# Patient Record
Sex: FEMALE | Race: WHITE
[De-identification: names, ages, dates, MRNs, and addresses within clinical notes are randomized per-mention and may not be internally consistent; named-entity substitution may affect disease eponyms.]

---

## 2017-03-05 ENCOUNTER — HOSPITAL ENCOUNTER (INPATIENT)
Dept: HOSPITAL 84 - D.ER | Age: 27
LOS: 3 days | Discharge: HOME | DRG: 103 | End: 2017-03-08
Attending: FAMILY MEDICINE | Admitting: FAMILY MEDICINE
Payer: MEDICAID

## 2017-03-05 VITALS
HEIGHT: 62 IN | BODY MASS INDEX: 23.6 KG/M2 | HEIGHT: 62 IN | WEIGHT: 128.27 LBS | WEIGHT: 128.27 LBS | BODY MASS INDEX: 23.6 KG/M2

## 2017-03-05 VITALS — DIASTOLIC BLOOD PRESSURE: 88 MMHG | SYSTOLIC BLOOD PRESSURE: 140 MMHG

## 2017-03-05 DIAGNOSIS — K52.9: ICD-10-CM

## 2017-03-05 DIAGNOSIS — G43.A0: Primary | ICD-10-CM

## 2017-03-05 DIAGNOSIS — T40.7X5A: ICD-10-CM

## 2017-03-05 DIAGNOSIS — F17.203: ICD-10-CM

## 2017-03-05 DIAGNOSIS — E86.0: ICD-10-CM

## 2017-03-05 DIAGNOSIS — F12.90: ICD-10-CM

## 2017-03-05 DIAGNOSIS — E87.6: ICD-10-CM

## 2017-03-05 DIAGNOSIS — E83.42: ICD-10-CM

## 2017-03-05 LAB
ALBUMIN SERPL-MCNC: 3.9 G/DL (ref 3.4–5)
ALP SERPL-CCNC: 76 U/L (ref 46–116)
ALT SERPL-CCNC: 15 U/L (ref 10–68)
AMYLASE SERPL-CCNC: 51 U/L (ref 25–115)
ANION GAP SERPL CALC-SCNC: 19.8 MMOL/L (ref 8–16)
BASOPHILS NFR BLD AUTO: 1 % (ref 0–2)
BILIRUB SERPL-MCNC: 0.32 MG/DL (ref 0.2–1.3)
BUN SERPL-MCNC: 10 MG/DL (ref 7–18)
CALCIUM SERPL-MCNC: 9.1 MG/DL (ref 8.5–10.1)
CHLORIDE SERPL-SCNC: 105 MMOL/L (ref 98–107)
CO2 SERPL-SCNC: 21.1 MMOL/L (ref 21–32)
CREAT SERPL-MCNC: 0.8 MG/DL (ref 0.6–1.3)
ERYTHROCYTE [DISTWIDTH] IN BLOOD BY AUTOMATED COUNT: 13.9 % (ref 11.5–14.5)
GLOBULIN SER-MCNC: 3.9 G/L
GLUCOSE SERPL-MCNC: 177 MG/DL (ref 74–106)
HCG SERPL-ACNC: NEGATIVE M[IU]/ML
HCT VFR BLD CALC: 40.9 % (ref 36–48)
HGB BLD-MCNC: 13.7 G/DL (ref 12–16)
LIPASE SERPL-CCNC: 92 U/L (ref 73–393)
LYMPHOCYTES NFR BLD AUTO: 7 % (ref 15–50)
MCH RBC QN AUTO: 30.9 PG (ref 26–34)
MCHC RBC AUTO-ENTMCNC: 33.5 G/DL (ref 31–37)
MCV RBC: 92.1 FL (ref 80–100)
MONOCYTES NFR BLD: 1 % (ref 2–11)
NEUTROPHILS NFR BLD AUTO: 89 % (ref 40–80)
OSMOLALITY SERPL CALC.SUM OF ELEC: 285 MOSM/KG (ref 275–300)
PLATELET # BLD EST: (no result) 10*3/UL
PLATELET # BLD: 455 10X3/UL (ref 130–400)
PMV BLD AUTO: 9.2 FL (ref 7.4–10.4)
POTASSIUM SERPL-SCNC: 3.9 MMOL/L (ref 3.5–5.1)
PROT SERPL-MCNC: 7.8 G/DL (ref 6.4–8.2)
RBC # BLD AUTO: 4.44 10X6/UL (ref 4–5.4)
SODIUM SERPL-SCNC: 142 MMOL/L (ref 136–145)
WBC # BLD AUTO: 25.3 10X3/UL (ref 4.8–10.8)

## 2017-03-05 NOTE — NUR
Patient Name: KATE GONZALES Admission Status: ER
Accout number: [id number] Admission Date: 2017
: 1990 Admission Diagnosis: Colitis
Attending: ESTEFANÍA Current LOS: 1
 
Anticipated DC Date:
2017
Planned Disposition: Home or Self Care
Primary Insurance: MEDICAID [place]
 
 
Discharge Planning Comments: Cm met with patient to complete initial discharge
planning assessment. Patient gave consent to complete assessment. Patient
reports she lives home with her boyfriend. She is independent in her care at
home. Patient plans to return to home at time of discharge. She is unsure of
dc needs at this time. CM will continue to follow and assist with dc
plan/needs.
 
:
Shawna Kowalski RN, [facility]
[phone]
 
Is the patient Alert and Oriented? Yes  0
* How many steps to enter\exit or inside your home? Five  0
* PCP Dr. Grajeda  0
* [place] [facility] [place]  [place]
* Preadmission Environment Home with Family  0
* ADLs Independent  0
* Equipment None  0
* List name and contact numbers for known caregivers / representatives who
currently or will assist patient after discharge: Pardeep mcdermott -
[phone]  0
* Community resources currently utilized None  0
* Additional services required to return to the preadmission environment? No
0
* Can the patient safely return to the preadmission environment? Yes  0
* Has this patient been hospitalized within the prior 30 days at any hospital?
No

## 2017-03-05 NOTE — NUR
RECIEVED PT TO FLOOR FROM ED VIA WHEELCHAIR. PT IS ALERT AND ORIENTED AND ABLE
TO VERBALIZE NEEDS. IV IS PATENT AND SALINE LOC AT THIS TIME. PT IS
AMBULATORY BUT WAS INSTRUCTED TO CALL FOR ANY ASSISTANCE NEEDED. PT STATES
PAIN IS 10/10. NO NEEDS ARE VERBALIZED AT THIS TIME. WILL CONTINUE TO MONITOR.
PT IS ORIENTED TO ROOM AND USE OF CALL LIGHT. SIDE RAILS ARE UP X 2. BED IS IN
LOWEST POSITION. CALL LIGHT IS WITHIN REACH.

## 2017-03-05 NOTE — NUR
ADMIT ASSESSMENT COMPLETED. IV FLUIDS HUNG PER ORDER. ANTIBIOTIC GIVEN PER
ORDER. MORPHINE PCA ALSO INITIATED AT THIS TIME FOR PAIN 10/10. NO FURTHER
NEEDS AT THIS TIME. WILL MONITOR. SIDE RAILS X 2. BED LOW. CALL LIGHT IN
REACH.

## 2017-03-06 VITALS — DIASTOLIC BLOOD PRESSURE: 52 MMHG | SYSTOLIC BLOOD PRESSURE: 107 MMHG

## 2017-03-06 VITALS — DIASTOLIC BLOOD PRESSURE: 88 MMHG | SYSTOLIC BLOOD PRESSURE: 140 MMHG

## 2017-03-06 VITALS — SYSTOLIC BLOOD PRESSURE: 114 MMHG | DIASTOLIC BLOOD PRESSURE: 71 MMHG

## 2017-03-06 VITALS — DIASTOLIC BLOOD PRESSURE: 59 MMHG | SYSTOLIC BLOOD PRESSURE: 111 MMHG

## 2017-03-06 VITALS — DIASTOLIC BLOOD PRESSURE: 68 MMHG | SYSTOLIC BLOOD PRESSURE: 113 MMHG

## 2017-03-06 VITALS — DIASTOLIC BLOOD PRESSURE: 72 MMHG | SYSTOLIC BLOOD PRESSURE: 144 MMHG

## 2017-03-06 LAB
ALBUMIN SERPL-MCNC: 3.3 G/DL (ref 3.4–5)
ALP SERPL-CCNC: 59 U/L (ref 46–116)
ALT SERPL-CCNC: 15 U/L (ref 10–68)
AMPHETAMINES UR QL SCN: NEGATIVE QUAL
ANION GAP SERPL CALC-SCNC: 14.9 MMOL/L (ref 8–16)
APPEARANCE UR: (no result)
BARBITURATES UR QL SCN: POSITIVE QUAL
BASOPHILS NFR BLD AUTO: 0.1 % (ref 0–2)
BENZODIAZ UR QL SCN: POSITIVE QUAL
BILIRUB SERPL-MCNC: 0.55 MG/DL (ref 0.2–1.3)
BILIRUB SERPL-MCNC: NEGATIVE MG/DL
BUN SERPL-MCNC: 8 MG/DL (ref 7–18)
BZE UR QL SCN: NEGATIVE QUAL
CALCIUM SERPL-MCNC: 8.8 MG/DL (ref 8.5–10.1)
CANNABINOIDS UR QL SCN: POSITIVE QUAL
CHLORIDE SERPL-SCNC: 106 MMOL/L (ref 98–107)
CO2 SERPL-SCNC: 24.4 MMOL/L (ref 21–32)
COLOR UR: YELLOW
CREAT SERPL-MCNC: 0.9 MG/DL (ref 0.6–1.3)
EOSINOPHIL NFR BLD: 0.1 % (ref 0–7)
ERYTHROCYTE [DISTWIDTH] IN BLOOD BY AUTOMATED COUNT: 13.9 % (ref 11.5–14.5)
EST. AVERAGE GLUCOSE BLD GHB EST-MCNC: 111 MG/DL (ref 74–154)
GLOBULIN SER-MCNC: 3.4 G/L
GLUCOSE SERPL-MCNC: 100 MG/DL
GLUCOSE SERPL-MCNC: 126 MG/DL (ref 74–106)
HBA1C MFR BLD: 5.5 % (ref 4.8–6)
HCT VFR BLD CALC: 36.4 % (ref 36–48)
HGB BLD-MCNC: 11.9 G/DL (ref 12–16)
IMM GRANULOCYTES NFR BLD: 0.3 % (ref 0–5)
KETONES UR STRIP-MCNC: (no result) MG/DL
LEUKOCYTE ESTERASE: NEGATIVE
LYMPHOCYTES NFR BLD AUTO: 25.2 % (ref 15–50)
MCH RBC QN AUTO: 29.8 PG (ref 26–34)
MCHC RBC AUTO-ENTMCNC: 32.7 G/DL (ref 31–37)
MCV RBC: 91 FL (ref 80–100)
MONOCYTES NFR BLD: 7.5 % (ref 2–11)
NEUTROPHILS NFR BLD AUTO: 66.8 % (ref 40–80)
NITRITE UR-MCNC: NEGATIVE MG/ML
OPIATES UR QL SCN: POSITIVE QUAL
OSMOLALITY SERPL CALC.SUM OF ELEC: 282 MOSM/KG (ref 275–300)
PCP UR QL SCN: NEGATIVE QUAL
PH UR STRIP: 6 [PH] (ref 5–6)
PLATELET # BLD: 374 10X3/UL (ref 130–400)
PMV BLD AUTO: 9.3 FL (ref 7.4–10.4)
POTASSIUM SERPL-SCNC: 3.3 MMOL/L (ref 3.5–5.1)
PROT SERPL-MCNC: 6.7 G/DL (ref 6.4–8.2)
PROT UR-MCNC: NEGATIVE MG/DL
RBC # BLD AUTO: 4 10X6/UL (ref 4–5.4)
SODIUM SERPL-SCNC: 142 MMOL/L (ref 136–145)
SP GR UR STRIP: 1.01 (ref 1–1.02)
UDS - METH: NEGATIVE QUAL
UROBILINOGEN UR-MCNC: NORMAL MG/DL
WBC # BLD AUTO: 13.7 10X3/UL (ref 4.8–10.8)

## 2017-03-06 NOTE — NUR
PT IV TO RIGHT AC ACCIDENTALLY PULLED OUT. D/C'D WITH CATHETER TIP INTACT. NEW
IV RESITED TO LEFT AC X 3 ATTEMPTS. GOOD BLOOD RETURN. FLUSHES W/O DIFFICUTLY.
FLUIDS AND PCA HOOKED BACK UP PER ORDER. PT TOLERATED WELL. NO NEEDS VOICED.
WILL MONITOR. SIDE RAILS X 2. BED LOW. CALL LIGHT IN REACH.

## 2017-03-07 VITALS — SYSTOLIC BLOOD PRESSURE: 101 MMHG | DIASTOLIC BLOOD PRESSURE: 79 MMHG

## 2017-03-07 VITALS — SYSTOLIC BLOOD PRESSURE: 109 MMHG | DIASTOLIC BLOOD PRESSURE: 53 MMHG

## 2017-03-07 VITALS — SYSTOLIC BLOOD PRESSURE: 104 MMHG | DIASTOLIC BLOOD PRESSURE: 60 MMHG

## 2017-03-07 VITALS — SYSTOLIC BLOOD PRESSURE: 110 MMHG | DIASTOLIC BLOOD PRESSURE: 65 MMHG

## 2017-03-07 VITALS — SYSTOLIC BLOOD PRESSURE: 119 MMHG | DIASTOLIC BLOOD PRESSURE: 76 MMHG

## 2017-03-07 VITALS — SYSTOLIC BLOOD PRESSURE: 115 MMHG | DIASTOLIC BLOOD PRESSURE: 66 MMHG

## 2017-03-07 LAB
ALBUMIN SERPL-MCNC: 2.8 G/DL (ref 3.4–5)
ALP SERPL-CCNC: 50 U/L (ref 46–116)
ALT SERPL-CCNC: 15 U/L (ref 10–68)
ANION GAP SERPL CALC-SCNC: 11.8 MMOL/L (ref 8–16)
BASOPHILS NFR BLD AUTO: 0.3 % (ref 0–2)
BILIRUB SERPL-MCNC: 0.69 MG/DL (ref 0.2–1.3)
BUN SERPL-MCNC: 5 MG/DL (ref 7–18)
CALCIUM SERPL-MCNC: 8.6 MG/DL (ref 8.5–10.1)
CHLORIDE SERPL-SCNC: 110 MMOL/L (ref 98–107)
CO2 SERPL-SCNC: 25.3 MMOL/L (ref 21–32)
CREAT SERPL-MCNC: 0.8 MG/DL (ref 0.6–1.3)
EOSINOPHIL NFR BLD: 1 % (ref 0–7)
ERYTHROCYTE [DISTWIDTH] IN BLOOD BY AUTOMATED COUNT: 14 % (ref 11.5–14.5)
ERYTHROCYTE [SEDIMENTATION RATE] IN BLOOD: 11 MM/HR (ref 0–20)
GLOBULIN SER-MCNC: 3.4 G/L
GLUCOSE SERPL-MCNC: 117 MG/DL (ref 74–106)
HCG SERPL-ACNC: NEGATIVE M[IU]/ML
HCT VFR BLD CALC: 35.7 % (ref 36–48)
HGB BLD-MCNC: 11.8 G/DL (ref 12–16)
IMM GRANULOCYTES NFR BLD: 0.3 % (ref 0–5)
LYMPHOCYTES NFR BLD AUTO: 18.3 % (ref 15–50)
MCH RBC QN AUTO: 30.2 PG (ref 26–34)
MCHC RBC AUTO-ENTMCNC: 33.1 G/DL (ref 31–37)
MCV RBC: 91.3 FL (ref 80–100)
MONOCYTES NFR BLD: 6.3 % (ref 2–11)
NEUTROPHILS NFR BLD AUTO: 73.8 % (ref 40–80)
OSMOLALITY SERPL CALC.SUM OF ELEC: 284 MOSM/KG (ref 275–300)
PLATELET # BLD: 332 10X3/UL (ref 130–400)
PMV BLD AUTO: 8.9 FL (ref 7.4–10.4)
POTASSIUM SERPL-SCNC: 3.1 MMOL/L (ref 3.5–5.1)
PROT SERPL-MCNC: 6.2 G/DL (ref 6.4–8.2)
RBC # BLD AUTO: 3.91 10X6/UL (ref 4–5.4)
SODIUM SERPL-SCNC: 144 MMOL/L (ref 136–145)
WBC # BLD AUTO: 16.1 10X3/UL (ref 4.8–10.8)

## 2017-03-07 NOTE — NUR
ASSESSMENT AS PER FLOWSHEET. IV PATENT LEFT AC OF D5LR AT 100CC'S/HR SITE
CLEAR . PCA OF MORPHINE IN USE WITH SETTINGS AT 1MG Q10MIN W/10MG Q4H L/O
DENIES NEES UP AD AMOR IN HALLWAYS WITH FAMILY MEMBERS.

## 2017-03-07 NOTE — NUR
PATIENT SITTING UP IN BED, WITH COMPLAINTS OF LOWER ABDOMINAL PAIN, 10/10 ON
PAIN SCALE.  PATIENT REPOSITIONED, WARM PACK APPLIED TO ABDOMIN, PAIN
MEDICATION GIVEN.  PATIENT 8/10 PAIN SCALE. BED LOW, SIDE RAILS UP, CONTINUE
PLAN OF CARE, CONTINUE TO MONITOR.

## 2017-03-08 VITALS — DIASTOLIC BLOOD PRESSURE: 70 MMHG | SYSTOLIC BLOOD PRESSURE: 113 MMHG

## 2017-03-08 VITALS — DIASTOLIC BLOOD PRESSURE: 66 MMHG | SYSTOLIC BLOOD PRESSURE: 117 MMHG

## 2017-03-08 LAB
ALBUMIN SERPL-MCNC: 2.6 G/DL (ref 3.4–5)
ALP SERPL-CCNC: 44 U/L (ref 46–116)
ALT SERPL-CCNC: 15 U/L (ref 10–68)
ANION GAP SERPL CALC-SCNC: 10.8 MMOL/L (ref 8–16)
BASOPHILS NFR BLD AUTO: 0.7 % (ref 0–2)
BILIRUB SERPL-MCNC: 0.42 MG/DL (ref 0.2–1.3)
BUN SERPL-MCNC: 6 MG/DL (ref 7–18)
CALCIUM SERPL-MCNC: 8.1 MG/DL (ref 8.5–10.1)
CHLORIDE SERPL-SCNC: 109 MMOL/L (ref 98–107)
CO2 SERPL-SCNC: 26.2 MMOL/L (ref 21–32)
CREAT SERPL-MCNC: 0.7 MG/DL (ref 0.6–1.3)
EOSINOPHIL NFR BLD: 3.2 % (ref 0–7)
ERYTHROCYTE [DISTWIDTH] IN BLOOD BY AUTOMATED COUNT: 13.9 % (ref 11.5–14.5)
GLOBULIN SER-MCNC: 3.2 G/L
GLUCOSE SERPL-MCNC: 105 MG/DL (ref 74–106)
HCT VFR BLD CALC: 32.1 % (ref 36–48)
HGB BLD-MCNC: 10.5 G/DL (ref 12–16)
IMM GRANULOCYTES NFR BLD: 0.2 % (ref 0–5)
LYMPHOCYTES NFR BLD AUTO: 50.6 % (ref 15–50)
MCH RBC QN AUTO: 30 PG (ref 26–34)
MCHC RBC AUTO-ENTMCNC: 32.7 G/DL (ref 31–37)
MCV RBC: 91.7 FL (ref 80–100)
MONOCYTES NFR BLD: 6.9 % (ref 2–11)
NEUTROPHILS NFR BLD AUTO: 38.4 % (ref 40–80)
OSMOLALITY SERPL CALC.SUM OF ELEC: 282 MOSM/KG (ref 275–300)
PLATELET # BLD: 356 10X3/UL (ref 130–400)
PMV BLD AUTO: 9.3 FL (ref 7.4–10.4)
POTASSIUM SERPL-SCNC: 3 MMOL/L (ref 3.5–5.1)
PROT SERPL-MCNC: 5.8 G/DL (ref 6.4–8.2)
RBC # BLD AUTO: 3.5 10X6/UL (ref 4–5.4)
SODIUM SERPL-SCNC: 143 MMOL/L (ref 136–145)
WBC # BLD AUTO: 9 10X3/UL (ref 4.8–10.8)

## 2017-03-08 NOTE — NUR
LEFT VIA W/C DISCHARGE INSTRUCTIONS GONE OVER WITHG PT DEMONSTRATES
UNDERSTANDING AT PRESENT RX GIVEN AT PRESENT.

## 2017-03-08 NOTE — NUR
CM REASSESSMENT NOTE:  PATIENT IS DISCHARGING HOME TODAY. FRIEND AT BEDSIDE
WILL DRIVE PATIENT HOME. PATIENT DENIES HOME HEALTH OR ANY OTHER NEEDS FOR
DISCHARGE.  CM WILL CONTINUE TO FOLLOW PATIENT WITH D/C NEEDS AND PLANS.

## 2017-07-25 ENCOUNTER — HOSPITAL ENCOUNTER (EMERGENCY)
Dept: HOSPITAL 84 - D.ER | Age: 27
Discharge: HOME | End: 2017-07-25
Payer: MEDICAID

## 2017-07-25 VITALS — BODY MASS INDEX: 23.4 KG/M2

## 2017-07-25 DIAGNOSIS — F12.10: Primary | ICD-10-CM

## 2017-07-25 DIAGNOSIS — R19.7: ICD-10-CM

## 2017-07-25 DIAGNOSIS — F17.200: ICD-10-CM

## 2017-07-25 DIAGNOSIS — R11.0: ICD-10-CM

## 2017-07-25 DIAGNOSIS — R11.10: ICD-10-CM

## 2017-07-25 LAB
ALBUMIN SERPL-MCNC: 3.7 G/DL (ref 3.4–5)
ALP SERPL-CCNC: 49 U/L (ref 46–116)
ALT SERPL-CCNC: 14 U/L (ref 10–68)
AMPHETAMINES UR QL SCN: NEGATIVE QUAL
AMYLASE SERPL-CCNC: 35 U/L (ref 25–115)
ANION GAP SERPL CALC-SCNC: 19.1 MMOL/L (ref 8–16)
APPEARANCE UR: CLEAR
BARBITURATES UR QL SCN: NEGATIVE QUAL
BASOPHILS NFR BLD AUTO: 0.4 % (ref 0–2)
BENZODIAZ UR QL SCN: NEGATIVE QUAL
BILIRUB SERPL-MCNC: 0.55 MG/DL (ref 0.2–1.3)
BILIRUB SERPL-MCNC: NEGATIVE MG/DL
BUN SERPL-MCNC: 8 MG/DL (ref 7–18)
BZE UR QL SCN: NEGATIVE QUAL
CALCIUM SERPL-MCNC: 9.2 MG/DL (ref 8.5–10.1)
CANNABINOIDS UR QL SCN: POSITIVE QUAL
CHLORIDE SERPL-SCNC: 108 MMOL/L (ref 98–107)
CO2 SERPL-SCNC: 17.7 MMOL/L (ref 21–32)
COLOR UR: YELLOW
CREAT SERPL-MCNC: 0.8 MG/DL (ref 0.6–1.3)
EOSINOPHIL NFR BLD: 0.6 % (ref 0–7)
ERYTHROCYTE [DISTWIDTH] IN BLOOD BY AUTOMATED COUNT: 13.6 % (ref 11.5–14.5)
GLOBULIN SER-MCNC: 3.5 G/L
GLUCOSE SERPL-MCNC: 144 MG/DL (ref 74–106)
GLUCOSE SERPL-MCNC: NEGATIVE MG/DL
HCG UR QL: NEGATIVE
HCT VFR BLD CALC: 44.2 % (ref 36–48)
HGB BLD-MCNC: 15.3 G/DL (ref 12–16)
IMM GRANULOCYTES NFR BLD: 0.2 % (ref 0–5)
KETONES UR STRIP-MCNC: (no result) MG/DL
LEUKOCYTE ESTERASE: NEGATIVE
LIPASE SERPL-CCNC: 83 U/L (ref 73–393)
LYMPHOCYTES NFR BLD AUTO: 33.9 % (ref 15–50)
MCH RBC QN AUTO: 31.7 PG (ref 26–34)
MCHC RBC AUTO-ENTMCNC: 34.6 G/DL (ref 31–37)
MCV RBC: 91.7 FL (ref 80–100)
MONOCYTES NFR BLD: 6.7 % (ref 2–11)
NEUTROPHILS NFR BLD AUTO: 58.2 % (ref 40–80)
NITRITE UR-MCNC: NEGATIVE MG/ML
OPIATES UR QL SCN: POSITIVE QUAL
OSMOLALITY SERPL CALC.SUM OF ELEC: 281 MOSM/KG (ref 275–300)
PCP UR QL SCN: NEGATIVE QUAL
PH UR STRIP: 5 [PH] (ref 5–6)
PLATELET # BLD: 360 10X3/UL (ref 130–400)
PMV BLD AUTO: 9 FL (ref 7.4–10.4)
POTASSIUM SERPL-SCNC: 3.8 MMOL/L (ref 3.5–5.1)
PROT SERPL-MCNC: 7.2 G/DL (ref 6.4–8.2)
PROT UR-MCNC: (no result) MG/DL
RBC # BLD AUTO: 4.82 10X6/UL (ref 4–5.4)
SODIUM SERPL-SCNC: 141 MMOL/L (ref 136–145)
SP GR UR STRIP: 1.02 (ref 1–1.02)
UDS - METH: NEGATIVE QUAL
UROBILINOGEN UR-MCNC: NORMAL MG/DL
WBC # BLD AUTO: 10.7 10X3/UL (ref 4.8–10.8)

## 2018-07-28 ENCOUNTER — HOSPITAL ENCOUNTER (EMERGENCY)
Dept: HOSPITAL 84 - D.ER | Age: 28
Discharge: HOME | End: 2018-07-28
Payer: SELF-PAY

## 2018-07-28 VITALS
HEIGHT: 62 IN | BODY MASS INDEX: 20.98 KG/M2 | WEIGHT: 114 LBS | BODY MASS INDEX: 20.98 KG/M2 | WEIGHT: 114 LBS | HEIGHT: 62 IN

## 2018-07-28 VITALS — SYSTOLIC BLOOD PRESSURE: 138 MMHG | DIASTOLIC BLOOD PRESSURE: 87 MMHG

## 2018-07-28 DIAGNOSIS — M79.672: ICD-10-CM

## 2018-07-28 DIAGNOSIS — F17.200: ICD-10-CM

## 2018-07-28 DIAGNOSIS — M54.5: Primary | ICD-10-CM
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
## 2018-07-28 LAB — HCG UR QL: NEGATIVE

## 2019-08-20 ENCOUNTER — HOSPITAL ENCOUNTER (EMERGENCY)
Dept: HOSPITAL 84 - D.ER | Age: 29
Discharge: HOME | End: 2019-08-20
Payer: SELF-PAY

## 2019-08-20 VITALS — SYSTOLIC BLOOD PRESSURE: 128 MMHG | DIASTOLIC BLOOD PRESSURE: 66 MMHG

## 2019-08-20 VITALS
HEIGHT: 62 IN | WEIGHT: 110.23 LBS | HEIGHT: 62 IN | BODY MASS INDEX: 20.28 KG/M2 | BODY MASS INDEX: 20.28 KG/M2 | WEIGHT: 110.23 LBS

## 2019-08-20 DIAGNOSIS — R11.2: ICD-10-CM

## 2019-08-20 DIAGNOSIS — B34.9: Primary | ICD-10-CM

## 2019-08-20 LAB
ALBUMIN SERPL-MCNC: 3.9 G/DL (ref 3.4–5)
ALP SERPL-CCNC: 68 U/L (ref 46–116)
ALT SERPL-CCNC: 20 U/L (ref 10–68)
AMORPHOUS SEDIMENT: (no result) /LPF
AMPHETAMINES UR QL SCN: NEGATIVE QUAL
AMYLASE SERPL-CCNC: 44 U/L (ref 25–115)
ANION GAP SERPL CALC-SCNC: 14 MMOL/L (ref 8–16)
APPEARANCE UR: (no result)
BACTERIA #/AREA URNS HPF: (no result) /HPF
BARBITURATES UR QL SCN: NEGATIVE QUAL
BASOPHILS NFR BLD AUTO: 0.3 % (ref 0–2)
BENZODIAZ UR QL SCN: NEGATIVE QUAL
BILIRUB SERPL-MCNC: 0.34 MG/DL (ref 0.2–1.3)
BILIRUB SERPL-MCNC: NEGATIVE MG/DL
BUN SERPL-MCNC: 13 MG/DL (ref 7–18)
BZE UR QL SCN: NEGATIVE QUAL
CALCIUM SERPL-MCNC: 9.3 MG/DL (ref 8.5–10.1)
CANNABINOIDS UR QL SCN: POSITIVE QUAL
CHLORIDE SERPL-SCNC: 109 MMOL/L (ref 98–107)
CO2 SERPL-SCNC: 24.7 MMOL/L (ref 21–32)
COLOR UR: YELLOW
CREAT SERPL-MCNC: 1 MG/DL (ref 0.6–1.3)
EOSINOPHIL NFR BLD: 0.6 % (ref 0–7)
ERYTHROCYTE [DISTWIDTH] IN BLOOD BY AUTOMATED COUNT: 13.2 % (ref 11.5–14.5)
GLOBULIN SER-MCNC: 3.3 G/L
GLUCOSE SERPL-MCNC: 168 MG/DL (ref 74–106)
GLUCOSE SERPL-MCNC: NEGATIVE MG/DL
HCG UR QL: NEGATIVE
HCT VFR BLD CALC: 42.5 % (ref 36–48)
HGB BLD-MCNC: 15 G/DL (ref 12–16)
IMM GRANULOCYTES NFR BLD: 0.2 % (ref 0–5)
KETONES UR STRIP-MCNC: (no result) MG/DL
LIPASE SERPL-CCNC: 123 U/L (ref 73–393)
LYMPHOCYTES NFR BLD AUTO: 22 % (ref 15–50)
MCH RBC QN AUTO: 32.5 PG (ref 26–34)
MCHC RBC AUTO-ENTMCNC: 35.3 G/DL (ref 31–37)
MCV RBC: 92 FL (ref 80–100)
MONOCYTES NFR BLD: 4.2 % (ref 2–11)
MUCOUS THREADS #/AREA URNS LPF: (no result) /LPF
NEUTROPHILS NFR BLD AUTO: 72.7 % (ref 40–80)
NITRITE UR-MCNC: NEGATIVE MG/ML
OPIATES UR QL SCN: POSITIVE QUAL
OSMOLALITY SERPL CALC.SUM OF ELEC: 290 MOSM/KG (ref 275–300)
PCP UR QL SCN: NEGATIVE QUAL
PH UR STRIP: 5 [PH] (ref 5–6)
PLATELET # BLD: 366 10X3/UL (ref 130–400)
PMV BLD AUTO: 9.3 FL (ref 7.4–10.4)
POTASSIUM SERPL-SCNC: 3.7 MMOL/L (ref 3.5–5.1)
PROT SERPL-MCNC: 7.2 G/DL (ref 6.4–8.2)
PROT UR-MCNC: NEGATIVE MG/DL
RBC # BLD AUTO: 4.62 10X6/UL (ref 4–5.4)
RBC #/AREA URNS HPF: (no result) /HPF (ref 0–5)
SODIUM SERPL-SCNC: 144 MMOL/L (ref 136–145)
SP GR UR STRIP: 1.02 (ref 1–1.02)
SQUAMOUS #/AREA URNS HPF: (no result) /HPF (ref 0–5)
TROPONIN I SERPL-MCNC: < 0.017 NG/ML (ref 0–0.06)
UROBILINOGEN UR-MCNC: NORMAL MG/DL
WBC # BLD AUTO: 15.5 10X3/UL (ref 4.8–10.8)
WBC #/AREA URNS HPF: (no result) /HPF (ref 0–5)

## 2019-08-21 ENCOUNTER — HOSPITAL ENCOUNTER (INPATIENT)
Dept: HOSPITAL 84 - D.ER | Age: 29
LOS: 2 days | Discharge: HOME | DRG: 690 | End: 2019-08-23
Attending: FAMILY MEDICINE | Admitting: FAMILY MEDICINE
Payer: MEDICAID

## 2019-08-21 VITALS — SYSTOLIC BLOOD PRESSURE: 145 MMHG | DIASTOLIC BLOOD PRESSURE: 88 MMHG

## 2019-08-21 VITALS — DIASTOLIC BLOOD PRESSURE: 83 MMHG | SYSTOLIC BLOOD PRESSURE: 123 MMHG

## 2019-08-21 VITALS — HEIGHT: 62 IN | WEIGHT: 120 LBS | BODY MASS INDEX: 22.08 KG/M2 | BODY MASS INDEX: 22.08 KG/M2

## 2019-08-21 DIAGNOSIS — F17.213: ICD-10-CM

## 2019-08-21 DIAGNOSIS — B96.20: ICD-10-CM

## 2019-08-21 DIAGNOSIS — N10: Primary | ICD-10-CM

## 2019-08-21 DIAGNOSIS — E86.0: ICD-10-CM

## 2019-08-21 LAB
ALBUMIN SERPL-MCNC: 4.1 G/DL (ref 3.4–5)
ALP SERPL-CCNC: 58 U/L (ref 46–116)
ALT SERPL-CCNC: 27 U/L (ref 10–68)
AMORPHOUS SEDIMENT: (no result) /LPF
AMYLASE SERPL-CCNC: 29 U/L (ref 25–115)
ANION GAP SERPL CALC-SCNC: 14.6 MMOL/L (ref 8–16)
APPEARANCE UR: (no result)
BACTERIA #/AREA URNS HPF: (no result) /HPF
BASOPHILS NFR BLD AUTO: 0.1 % (ref 0–2)
BILIRUB SERPL-MCNC: 0.62 MG/DL (ref 0.2–1.3)
BILIRUB SERPL-MCNC: NEGATIVE MG/DL
BUN SERPL-MCNC: 18 MG/DL (ref 7–18)
CALCIUM SERPL-MCNC: 10.2 MG/DL (ref 8.5–10.1)
CHLORIDE SERPL-SCNC: 106 MMOL/L (ref 98–107)
CO2 SERPL-SCNC: 23.9 MMOL/L (ref 21–32)
COLOR UR: YELLOW
CREAT SERPL-MCNC: 0.9 MG/DL (ref 0.6–1.3)
EOSINOPHIL NFR BLD: 0 % (ref 0–7)
ERYTHROCYTE [DISTWIDTH] IN BLOOD BY AUTOMATED COUNT: 13.5 % (ref 11.5–14.5)
GLOBULIN SER-MCNC: 3.4 G/L
GLUCOSE SERPL-MCNC: 146 MG/DL (ref 74–106)
GLUCOSE SERPL-MCNC: NEGATIVE MG/DL
HCG UR QL: NEGATIVE
HCT VFR BLD CALC: 40.5 % (ref 36–48)
HGB BLD-MCNC: 14.2 G/DL (ref 12–16)
IMM GRANULOCYTES NFR BLD: 0.4 % (ref 0–5)
KETONES UR STRIP-MCNC: (no result) MG/DL
LIPASE SERPL-CCNC: 68 U/L (ref 73–393)
LYMPHOCYTES NFR BLD AUTO: 15.5 % (ref 15–50)
MCH RBC QN AUTO: 32.1 PG (ref 26–34)
MCHC RBC AUTO-ENTMCNC: 35.1 G/DL (ref 31–37)
MCV RBC: 91.4 FL (ref 80–100)
MONOCYTES NFR BLD: 6.5 % (ref 2–11)
MUCOUS THREADS #/AREA URNS LPF: (no result) /LPF
NEUTROPHILS NFR BLD AUTO: 77.5 % (ref 40–80)
NITRITE UR-MCNC: POSITIVE MG/ML
OSMOLALITY SERPL CALC.SUM OF ELEC: 285 MOSM/KG (ref 275–300)
PH UR STRIP: 5 [PH] (ref 5–6)
PLATELET # BLD: 334 10X3/UL (ref 130–400)
PMV BLD AUTO: 9.2 FL (ref 7.4–10.4)
POTASSIUM SERPL-SCNC: 3.5 MMOL/L (ref 3.5–5.1)
PROT SERPL-MCNC: 7.5 G/DL (ref 6.4–8.2)
PROT UR-MCNC: (no result) MG/DL
RBC # BLD AUTO: 4.43 10X6/UL (ref 4–5.4)
RBC #/AREA URNS HPF: (no result) /HPF (ref 0–5)
SODIUM SERPL-SCNC: 141 MMOL/L (ref 136–145)
SP GR UR STRIP: 1.02 (ref 1–1.02)
SQUAMOUS #/AREA URNS HPF: (no result) /HPF (ref 0–5)
TROPONIN I SERPL-MCNC: < 0.017 NG/ML (ref 0–0.06)
UROBILINOGEN UR-MCNC: NORMAL MG/DL
WBC # BLD AUTO: 18.7 10X3/UL (ref 4.8–10.8)
WBC #/AREA URNS HPF: (no result) /HPF (ref 0–5)

## 2019-08-21 NOTE — NUR
NEW PATIENT ADMIT FROM ER VIA HOSPITAL BED AND HOSPITAL STAFF. PATIENT IS
AWAKE AND LETHARGIC. IV TO LT AC. WILL CONTINUE WITH PLAN OF CARE.

## 2019-08-21 NOTE — NUR
PT ALERT AND ORIENTED X4 LAYING IN BED. PT LETHARGIC AND COMPLAINS OF PAIN IN
ABDOMEN. PT IS ON PCA AT THIS TIME. HEATING PAD PLACED ON PT. VITALS STABLE,
BED LOW, CALL LIGHT WITHIN REACH. WILL CONTINUE TO MONITOR.

## 2019-08-22 VITALS — DIASTOLIC BLOOD PRESSURE: 74 MMHG | SYSTOLIC BLOOD PRESSURE: 137 MMHG

## 2019-08-22 VITALS — SYSTOLIC BLOOD PRESSURE: 102 MMHG | DIASTOLIC BLOOD PRESSURE: 59 MMHG

## 2019-08-22 VITALS — DIASTOLIC BLOOD PRESSURE: 65 MMHG | SYSTOLIC BLOOD PRESSURE: 125 MMHG

## 2019-08-22 VITALS — DIASTOLIC BLOOD PRESSURE: 62 MMHG | SYSTOLIC BLOOD PRESSURE: 109 MMHG

## 2019-08-22 LAB
ALBUMIN SERPL-MCNC: 3.3 G/DL (ref 3.4–5)
ALP SERPL-CCNC: 50 U/L (ref 46–116)
ALT SERPL-CCNC: 29 U/L (ref 10–68)
AMPHETAMINES UR QL SCN: NEGATIVE QUAL
ANION GAP SERPL CALC-SCNC: 10.9 MMOL/L (ref 8–16)
BARBITURATES UR QL SCN: NEGATIVE QUAL
BASOPHILS NFR BLD AUTO: 0.3 % (ref 0–2)
BENZODIAZ UR QL SCN: NEGATIVE QUAL
BILIRUB SERPL-MCNC: 0.47 MG/DL (ref 0.2–1.3)
BUN SERPL-MCNC: 15 MG/DL (ref 7–18)
BZE UR QL SCN: NEGATIVE QUAL
CALCIUM SERPL-MCNC: 8.5 MG/DL (ref 8.5–10.1)
CANNABINOIDS UR QL SCN: POSITIVE QUAL
CHLORIDE SERPL-SCNC: 104 MMOL/L (ref 98–107)
CO2 SERPL-SCNC: 27.9 MMOL/L (ref 21–32)
CREAT SERPL-MCNC: 0.6 MG/DL (ref 0.6–1.3)
EOSINOPHIL NFR BLD: 0.3 % (ref 0–7)
ERYTHROCYTE [DISTWIDTH] IN BLOOD BY AUTOMATED COUNT: 13.1 % (ref 11.5–14.5)
GLOBULIN SER-MCNC: 2.9 G/L
GLUCOSE SERPL-MCNC: 115 MG/DL (ref 74–106)
HCT VFR BLD CALC: 37.3 % (ref 36–48)
HGB BLD-MCNC: 12.8 G/DL (ref 12–16)
IMM GRANULOCYTES NFR BLD: 0.2 % (ref 0–5)
LYMPHOCYTES NFR BLD AUTO: 30.3 % (ref 15–50)
MCH RBC QN AUTO: 31.1 PG (ref 26–34)
MCHC RBC AUTO-ENTMCNC: 34.3 G/DL (ref 31–37)
MCV RBC: 90.5 FL (ref 80–100)
MONOCYTES NFR BLD: 7 % (ref 2–11)
NEUTROPHILS NFR BLD AUTO: 61.9 % (ref 40–80)
OPIATES UR QL SCN: POSITIVE QUAL
OSMOLALITY SERPL CALC.SUM OF ELEC: 279 MOSM/KG (ref 275–300)
PCP UR QL SCN: NEGATIVE QUAL
PLATELET # BLD: 284 10X3/UL (ref 130–400)
PMV BLD AUTO: 9.2 FL (ref 7.4–10.4)
POTASSIUM SERPL-SCNC: 3.8 MMOL/L (ref 3.5–5.1)
PROT SERPL-MCNC: 6.2 G/DL (ref 6.4–8.2)
RBC # BLD AUTO: 4.12 10X6/UL (ref 4–5.4)
SODIUM SERPL-SCNC: 139 MMOL/L (ref 136–145)
WBC # BLD AUTO: 11.9 10X3/UL (ref 4.8–10.8)

## 2019-08-22 NOTE — NUR
ALERT SITTING UP IN BED C/O SEVERE ABD PAIN, INSTRUCTED THAT WOULD GIVE PAIN
MEDICATION AS SOON AS ITS TIME, SEE SHIFT ASSESSMENT, CALL LIGHT IN REEACH

## 2019-08-22 NOTE — NUR
UP TO SHOWER AFTER RECIEVING ZODEVIKA NOW CRYING WITH SEVERE ABD PAIN REQUESTING
TORODOL, GIVEN AS ORDERED

## 2019-08-22 NOTE — NUR
AGAIN CRYING IN PAIN, REQUESTING DEMEROL, EXPLAINED THAT IF GET IT NOW WOULD
NOT BE ABLE TO HAVE ANYTHING ELSE UNTIL 0430 STATES I DONT CARE NEED SOMETHING
NOW DEMEROL GIVEN AS ORDERED

## 2019-08-22 NOTE — NUR
PT RESTING IN BEDS WITH EYES CLOSE. RR EVEN AND UNLABORED. NO S/S OF DISTRESS
AT THIS TIME. BED LOW CALL LIGHT WITHIN REACH, WILL CONTINUE TO MONITOR.

## 2019-08-22 NOTE — NUR
CRYING IN PAIN STATES GOT TO CALL SOMEBODY AND GET ME SOMETHING, REFUSED TO
LAY IN BED UP AMBULATING TO NURSING STATION CRYING, ELLEN SPIVEY REPORTED PAIN
UNRELIEVED WITH CURRENT MEDS, ORDER RECIEVED FOR ONE TIME DOSE OF MORPHINE 2MG
IV, GIVEN AS ORDERED

## 2019-08-22 NOTE — NUR
N/V ZOFRAN GIVEN, CONTINUES TO C/O ABD PAIN REQUESTIGN TORODOL, INSTRUCTED SUSHMA
HAD ONLY BEEN AN HOUR SINCE DEMEROL NEED TO SPACE PAIN OUT MORE, WILL MONITOR

## 2019-08-22 NOTE — NUR
PT HAS COMPLAINED OF CONTINUED PAIN THROUGHOUT THE DAY.  DILAUDID HAS BEEN
DC'D AND HAS BEEN GIVEN TORODOL AND DEMEROL.  STATES AT PRESENT TIME HER PAIN
IN ABOUT A 6.  HAD N/V WITH GREEN EMESIS EARLIER IN THE DAY BUT IMPROVED WITH
ZOFRAN AND IS NOW SIPPING AND KEEPING DOWN [employer]ON[employer]A. NO REQUESTS AT
PRESENT TIME.

## 2019-08-23 VITALS — DIASTOLIC BLOOD PRESSURE: 85 MMHG | SYSTOLIC BLOOD PRESSURE: 174 MMHG

## 2019-08-23 VITALS — SYSTOLIC BLOOD PRESSURE: 129 MMHG | DIASTOLIC BLOOD PRESSURE: 82 MMHG

## 2019-08-23 VITALS — SYSTOLIC BLOOD PRESSURE: 126 MMHG | DIASTOLIC BLOOD PRESSURE: 74 MMHG

## 2019-08-23 LAB
ALBUMIN SERPL-MCNC: 3.5 G/DL (ref 3.4–5)
ALP SERPL-CCNC: 49 U/L (ref 46–116)
ALT SERPL-CCNC: 29 U/L (ref 10–68)
ANION GAP SERPL CALC-SCNC: 15.9 MMOL/L (ref 8–16)
BASOPHILS NFR BLD AUTO: 0.2 % (ref 0–2)
BILIRUB SERPL-MCNC: 0.45 MG/DL (ref 0.2–1.3)
BUN SERPL-MCNC: 16 MG/DL (ref 7–18)
CALCIUM SERPL-MCNC: 8.7 MG/DL (ref 8.5–10.1)
CHLORIDE SERPL-SCNC: 102 MMOL/L (ref 98–107)
CO2 SERPL-SCNC: 22.7 MMOL/L (ref 21–32)
CREAT SERPL-MCNC: 0.7 MG/DL (ref 0.6–1.3)
EOSINOPHIL NFR BLD: 0.2 % (ref 0–7)
ERYTHROCYTE [DISTWIDTH] IN BLOOD BY AUTOMATED COUNT: 12.7 % (ref 11.5–14.5)
GLOBULIN SER-MCNC: 2.9 G/L
GLUCOSE SERPL-MCNC: 123 MG/DL (ref 74–106)
HCT VFR BLD CALC: 37.5 % (ref 36–48)
HGB BLD-MCNC: 13.2 G/DL (ref 12–16)
IMM GRANULOCYTES NFR BLD: 0.4 % (ref 0–5)
LYMPHOCYTES NFR BLD AUTO: 26 % (ref 15–50)
MCH RBC QN AUTO: 31.6 PG (ref 26–34)
MCHC RBC AUTO-ENTMCNC: 35.2 G/DL (ref 31–37)
MCV RBC: 89.7 FL (ref 80–100)
MONOCYTES NFR BLD: 8.8 % (ref 2–11)
NEUTROPHILS NFR BLD AUTO: 64.4 % (ref 40–80)
OSMOLALITY SERPL CALC.SUM OF ELEC: 275 MOSM/KG (ref 275–300)
PLATELET # BLD: 268 10X3/UL (ref 130–400)
PMV BLD AUTO: 9 FL (ref 7.4–10.4)
POTASSIUM SERPL-SCNC: 3.6 MMOL/L (ref 3.5–5.1)
PROT SERPL-MCNC: 6.4 G/DL (ref 6.4–8.2)
RBC # BLD AUTO: 4.18 10X6/UL (ref 4–5.4)
SODIUM SERPL-SCNC: 137 MMOL/L (ref 136–145)
WBC # BLD AUTO: 10.9 10X3/UL (ref 4.8–10.8)

## 2019-08-23 NOTE — NUR
BACK IN BED AND REQUESTING PAIN MED AND ALSO SOMETHING FOR NAUSEA.  STATES SHE
VOMITED IN SHOWER BUT NONE WAS OBSERVED.  MEDICATION GIVEN AND IV RECONNECTED
AT KVO RATE.  ASSESSMENT COMPLETED AND WILL CONTINUE POC.  BED IN LOW LOCKED
POSITION AND CALL LIGHT IN REACH.

## 2019-08-23 NOTE — NUR
DISCHARGE INSTRUCTIONS REVIEWED WITH PT AND VERBALIZES UNDERSTANDING.
PRESCRIPTION GIVEN FOR PAIN MED AND ANTIBIOTICS.  IV DC'D LEFT AC WITH CATH
TIP INTACT.  LEFT FLOOR VIA W/C WITH ALL PERSONAL BELONGINGS AND LEFT
FACILITY VIA PRIVATE VEHICLE WITH HER UNCLE.

## 2019-08-23 NOTE — NUR
SITTING ON SHOWER CHAIR IN SHOWER AND REQUESTING PAIN MED.  INFORMED PT WOULD
NOT ADMINISTER MED WHILE SHE IS IN THE SHOWER AND WHEN SHE DECIDES TO GET BACK
IN BED I WOULD GIVE IT TO HER.

## 2019-08-23 NOTE — MORECARE
CASE MANAGEMENT DISCHARGE SUMMARY
 
 
PATIENT: KATE GONZALES              UNIT: [id number]
ACCOUNT#: [id number]                       ADM DATE: 19
AGE: 29     : [birth year]  SEX: F            ROOM/BED: D.2136    
AUTHOR: DAYTON MARADIAGA                             PHYSICIAN:                               
 
REFERRING PHYSICIAN: NITISH ROJAS DO            
DATE OF SERVICE: 19
Discharge Plan
 
 
Patient Name: KATE GONZALES
Facility: [facility]:[place]
Encounter #: [id number]
Medical Record #: [id number]
: 1990
Planned Disposition: Home
Anticipated Discharge Date: 19
 
Discharge Date: 2019
Expected LOS: 2
Initial Reviewer: [email or web address]
Initial Review Date: 2019
Generated: 19   4:57 pm 
  
 
 
 
 
 
 
 
Patient Name: KATE GONZALES
 
Encounter #: [id number]
Page [id number]
 
 
 
 
 
Electronically Signed by DAYTON MARADIAGA on 19 at 1557
 
 
 
 
 
 
**All edits/amendments must be made on the electronic document**
 
DICTATION DATE: 19 1557     : RAFFY  19 1557     
RPT#: [id number]                                DC DATE:19
                                               STATUS: DIS IN  
[facility]
[address] [place] [address]
***END OF REPORT***

## 2019-08-23 NOTE — MORECARE
CASE MANAGEMENT DISCHARGE SUMMARY
 
 
PATIENT: KATE GONZALES              UNIT: [id number]
ACCOUNT#: [id number]                       ADM DATE: 19
AGE: 29     : [birth year]  SEX: F            ROOM/BED: D.2136    
AUTHOR: DAYTON MARADIAGA                             PHYSICIAN:                               
 
REFERRING PHYSICIAN: NITISH ROJAS DO            
DATE OF SERVICE: 19
Discharge Plan
 
 
Patient Name: KATE GONZALES
Facility: [facility]:[place]
Encounter #: [id number]
Medical Record #: [id number]
: 1990
Planned Disposition: Home
Anticipated Discharge Date: 19
 
Discharge Date: 2019
Expected LOS: 2
Initial Reviewer: [email or web address]
Initial Review Date: 2019
Generated: 19   5:11 pm 
 DCPIA - Discharge Planning Initial Assessment
 
Updated by [email or web address]: Panfilo Barnes on 19   4:06 pm
*  Is the patient Alert and Oriented?
Yes
*  How many steps to enter\exit or inside your home? 5 OR MORE *  PCP NONE *  Pharmacy
[facility] [place]
*  Preadmission Environment
Home with Family
*  ADLs
Independent
*  Equipment
None
*  Other Equipment
NO MEDICAL EQUIPMENT PROVIDER PREFERENCE
*  List name and contact numbers for known caregivers / representatives who 
currently or will assist patient after discharge:
MARIAM HER, SPOUSE, [phone]  ROSALIO HER, MOTHER IN LAW, 
[phone]
*  Verbal permission to speak to the caregivers and representatives has been 
obtained from the patient.
Yes
*  Community resources currently utilized
None
 
*  Please name any agencies selected above.
NONE
*  Additional services required to return to the preadmission environment?
No
*  Can the patient safely return to the preadmission environment?
Yes
*  Has this patient been hospitalized within the prior 30 days at any 
hospital?
No
 
 
 
 
 
 
 
Last DP export: 19   2:57 p
Patient Name: KATE GONZALES
Encounter #: [id number]
Page [id number]
 
 
 
 
 
Electronically Signed by DAYTON MARADIAGA on 19 at 1612
 
 
 
 
 
 
**All edits/amendments must be made on the electronic document**
 
DICTATION DATE: 19     : RAFFY  19 1611     
RPT#: [id number]                                DC DATE:19
                                               STATUS: DIS IN  
[facility]
 [address]
[place] [address]
***END OF REPORT***

## 2019-08-23 NOTE — MORECARE
CASE MANAGEMENT DISCHARGE SUMMARY
 
 
PATIENT: KATE GONZALES              UNIT: [id number]
ACCOUNT#: [id number]                       ADM DATE: 19
AGE: 29     : [birth year]  SEX: F            ROOM/BED: D.2136    
AUTHOR: HIREN,DOC                             PHYSICIAN:                               
 
REFERRING PHYSICIAN: NITISH ROJAS DO            
DATE OF SERVICE: 19
Discharge Plan
 
 
Patient Name: KATE GONZALES
Facility: [facility]:[place]
Encounter #: [id number]
Medical Record #: [id number]
: 1990
Planned Disposition: Home
Anticipated Discharge Date: 19
 
Discharge Date: 2019
Expected LOS: 2
Initial Reviewer: [email or web address]
Initial Review Date: 2019
Generated: 19   5:20 pm 
Comments
 
DCP- Discharge Planning
 
Updated by [email or web address]: Panfilo Barnes on 19   3:14 pm CT
Patient Name: KATE GONZALES                                     
Admission Status: ER   
Accout number: [id number]                              
Admission Date: 2019   
: 1990                                                        
Admission Diagnosis:ACUTE PYELONEPHRITIS   
Attending: NITISH ROJAS                                                
Current LOS:  2   
  
Anticipated DC Date: 2019   
Planned Disposition: Home   
Primary Insurance: MEDICAID [place]   
  
  
Discharge Planning Comments:   
CM MET WITH PT IN ROOM TO DISCUSS DISCHARGE PLANNING AND NEEDS. PT REPORTS 
LIVING AT HOME INDEPENDENTLY WITH HER FIANCE AND CHILDREN.  PT REPORTS IF SHE 
NEEDS HELP GETTING IN AND OUT OF THE BATHTUB, HER UNCLE ASSISTS.   PT HAS NO 
MEDICAL EQUIPMENT AND NO OUTSIDE SERVICES ASSISTING IN THE HOME. CM DISCUSSED 
 
AVAILABILITY OF HOME HEALTH, REHAB SERVICES AND MEDICAL EQUIPMENT. PT DENIES 
DISCHARGE NEEDS, REPORTS HER UNCLE WILL PICK HER UP FOR DISCHARGE HOME.  
  
  
: Panfilo Barnes
 DCPIA - Discharge Planning Initial Assessment
 
Updated by [id number]: Panfilo Barnes on 19   4:06 pm
*  Is the patient Alert and Oriented?
Yes
*  How many steps to enter\exit or inside your home? 5 OR MORE *  PCP NONE *  Pharmacy
[facility] [place]
*  Preadmission Environment
Home with Family
*  ADLs
Independent
*  Equipment
None
*  Other Equipment
NO MEDICAL EQUIPMENT PROVIDER PREFERENCE
*  List name and contact numbers for known caregivers / representatives who 
currently or will assist patient after discharge:
MARIAM HER, SPOUSE, [phone]  ROSALIO HER, MOTHER IN LAW, 
[phone]
*  Verbal permission to speak to the caregivers and representatives has been 
obtained from the patient.
Yes
*  Community resources currently utilized
None
*  Please name any agencies selected above.
NONE
*  Additional services required to return to the preadmission environment?
No
*  Can the patient safely return to the preadmission environment?
Yes
*  Has this patient been hospitalized within the prior 30 days at any 
hospital?
No
 
 
 
 
 
 
 
Last DP export: 19   3:12 p
Patient Name: KATE GONZALES
 
Encounter #: [id number]
Page [id number]
 
 
 
 
 
Electronically Signed by DAYTON MARADIAGA on 19 at 1620
 
 
 
 
 
 
**All edits/amendments must be made on the electronic document**
 
DICTATION DATE: 19     : RAFFY  19     
RPT#: [id number]                                DC DATE:19
                                               STATUS: DIS IN  
[facility]
[address] [place] [address]
***END OF REPORT***